# Patient Record
Sex: MALE | Race: WHITE | NOT HISPANIC OR LATINO | Employment: FULL TIME | URBAN - METROPOLITAN AREA
[De-identification: names, ages, dates, MRNs, and addresses within clinical notes are randomized per-mention and may not be internally consistent; named-entity substitution may affect disease eponyms.]

---

## 2017-01-23 ENCOUNTER — TELEPHONE (OUTPATIENT)
Dept: NEUROLOGY | Facility: CLINIC | Age: 47
End: 2017-01-23

## 2017-01-23 NOTE — TELEPHONE ENCOUNTER
Call placed to pt re: referral message received that MRI brain was done outside of [facility] on 12/21/16. Pt admits this and says that was done at [facility]. He will have report faxed to this office (number provided) and will bring disc to his f/u appt on 2/1/17. Unsure if comparison was done. [facility] MRI appt canceled.

## 2017-01-24 ENCOUNTER — DOCUMENTATION ONLY (OUTPATIENT)
Dept: NEUROLOGY | Facility: CLINIC | Age: 47
End: 2017-01-24

## 2017-01-24 NOTE — PROGRESS NOTES
Fax received from [facility] with MRI brain results (done 12/21/16). Placed in Carmelina's folder for review.

## 2017-01-25 NOTE — PROGRESS NOTES
Report received/reviewed from [facility] regarding MRI Brain performed on 12/21/2016.  Stable MRI as compared to previous done 1/2016 with no new or enhancing lesions noted.   Will upload report into [facility]  No Disc for review

## 2017-07-13 ENCOUNTER — LAB VISIT (OUTPATIENT)
Dept: LAB | Facility: HOSPITAL | Age: 47
End: 2017-07-13
Attending: PSYCHIATRY & NEUROLOGY
Payer: COMMERCIAL

## 2017-07-13 DIAGNOSIS — G35 MULTIPLE SCLEROSIS: Primary | ICD-10-CM

## 2017-07-13 PROCEDURE — 88108 CYTOPATH CONCENTRATE TECH: CPT | Performed by: PATHOLOGY

## 2019-04-23 ENCOUNTER — TELEPHONE (OUTPATIENT)
Dept: ORTHOPEDICS | Facility: CLINIC | Age: 49
End: 2019-04-23

## 2019-04-23 NOTE — TELEPHONE ENCOUNTER
----- Message from Laure Ruano MA sent at 4/23/2019 12:31 PM CDT -----  Contact: kristen   Wants to be seen 04/29/2019, surgery follow up   Call back

## 2019-04-26 ENCOUNTER — TELEPHONE (OUTPATIENT)
Dept: ORTHOPEDICS | Facility: CLINIC | Age: 49
End: 2019-04-26

## 2019-04-26 NOTE — TELEPHONE ENCOUNTER
Spoke to patient. Advised to contact [facility] to have pain medication refilled. Patient stated understanding.

## 2019-04-26 NOTE — TELEPHONE ENCOUNTER
----- Message from Laure Ruano MA sent at 4/26/2019  1:51 PM CDT -----  Contact: kristen Payton taken out today,, finger is throbbing  Pharmacy [facility]    Call back

## 2019-04-29 ENCOUNTER — OFFICE VISIT (OUTPATIENT)
Dept: ORTHOPEDICS | Facility: CLINIC | Age: 49
End: 2019-04-29
Payer: COMMERCIAL

## 2019-04-29 VITALS
TEMPERATURE: 98 F | SYSTOLIC BLOOD PRESSURE: 131 MMHG | HEIGHT: 71 IN | RESPIRATION RATE: 20 BRPM | WEIGHT: 210.13 LBS | DIASTOLIC BLOOD PRESSURE: 90 MMHG | HEART RATE: 101 BPM | BODY MASS INDEX: 29.42 KG/M2

## 2019-04-29 DIAGNOSIS — M24.541 CONTRACTURE OF FINGER JOINT, RIGHT: Primary | ICD-10-CM

## 2019-04-29 PROCEDURE — 99999 PR PBB SHADOW E&M-EST. PATIENT-LVL IV: CPT | Mod: PBBFAC,,, | Performed by: ORTHOPAEDIC SURGERY

## 2019-04-29 PROCEDURE — 99999 PR PBB SHADOW E&M-EST. PATIENT-LVL IV: ICD-10-PCS | Mod: PBBFAC,,, | Performed by: ORTHOPAEDIC SURGERY

## 2019-04-29 PROCEDURE — 99024 POSTOP FOLLOW-UP VISIT: CPT | Mod: S$GLB,,, | Performed by: ORTHOPAEDIC SURGERY

## 2019-04-29 PROCEDURE — 99024 PR POST-OP FOLLOW-UP VISIT: ICD-10-PCS | Mod: S$GLB,,, | Performed by: ORTHOPAEDIC SURGERY

## 2019-04-29 NOTE — PROGRESS NOTES
Mr Galeano returns to clinic today. He is approximately 6 weeks status post right small finger PIP pinning with contracture release.  He is overall doing well.  His K-wires were removed several days ago.  He has no other complaints.    Physical exam:  Examination of the right small finger reveals that all wounds are well healed.  There is no edema.  There is no drainage.  Palpation produces no significant tenderness.  Is grossly neurovascularly intact.    Assessment:  Status post right small finger PIP contracture release    Plan:    1.  I will set him up with occupational therapy to begin range of motion    2.  He can begin washing his small finger and doing light daily dressing changes    3.  Follow-up with me in 4 weeks for repeat evaluation.

## 2019-04-30 ENCOUNTER — TELEPHONE (OUTPATIENT)
Dept: ORTHOPEDICS | Facility: CLINIC | Age: 49
End: 2019-04-30

## 2019-04-30 NOTE — TELEPHONE ENCOUNTER
----- Message from Fazal Morocho sent at 4/30/2019  9:47 AM CDT -----  Contact: patient  Type: Needs Medical Advice    Who Called:  patient  Symptoms (please be specific):    How long has patient had these symptoms:    Pharmacy name and phone #:    Best Call Back Number: [phone]  Additional Information: requesting a call back regarding physical therapy,stated order should have been sent to [place] [facility]#[phone]

## 2019-04-30 NOTE — TELEPHONE ENCOUNTER
I informed patient that I am faxing order over immediately to [facility] in [place] at [phone].  Pt verbalized understanding.  I advised pt to call clinic for any other concerns.

## 2019-05-29 ENCOUNTER — OFFICE VISIT (OUTPATIENT)
Dept: ORTHOPEDICS | Facility: CLINIC | Age: 49
End: 2019-05-29
Payer: COMMERCIAL

## 2019-05-29 VITALS
HEART RATE: 92 BPM | WEIGHT: 228 LBS | DIASTOLIC BLOOD PRESSURE: 85 MMHG | BODY MASS INDEX: 31.92 KG/M2 | HEIGHT: 71 IN | SYSTOLIC BLOOD PRESSURE: 136 MMHG

## 2019-05-29 DIAGNOSIS — M24.541 CONTRACTURE OF FINGER JOINT, RIGHT: Primary | ICD-10-CM

## 2019-05-29 PROCEDURE — 99024 POSTOP FOLLOW-UP VISIT: CPT | Mod: S$GLB,,, | Performed by: ORTHOPAEDIC SURGERY

## 2019-05-29 PROCEDURE — 99999 PR PBB SHADOW E&M-EST. PATIENT-LVL III: CPT | Mod: PBBFAC,,, | Performed by: ORTHOPAEDIC SURGERY

## 2019-05-29 PROCEDURE — 99024 PR POST-OP FOLLOW-UP VISIT: ICD-10-PCS | Mod: S$GLB,,, | Performed by: ORTHOPAEDIC SURGERY

## 2019-05-29 PROCEDURE — 99999 PR PBB SHADOW E&M-EST. PATIENT-LVL III: ICD-10-PCS | Mod: PBBFAC,,, | Performed by: ORTHOPAEDIC SURGERY

## 2019-05-29 RX ORDER — MIRTAZAPINE 30 MG/1
30 TABLET, FILM COATED ORAL NIGHTLY
COMMUNITY

## 2019-05-29 NOTE — PROGRESS NOTES
Mr Galeano returns to clinic today.  Has a history of right small finger contracture release.  He continues to work with therapy but has some stiffness in the finger.    Physical exam:  Examination the right small finger reveals that all wounds are well healed.  There is no edema or erythema.  Range of motion is 30 to 50° at the PIP joint.  MCP and DIP motion are full.  He does report intact sensation with capillary refill less than 2 sec    Assessment:  Status post right small finger contracture release    Plan:    1.  He will continue work with therapy and I will increase the frequency to 3 times per week    2.  He is allowed to increase activity as tolerated    3.  Will follow up with me in 6 weeks for repeat evaluation

## 2020-11-20 PROBLEM — N20.0 KIDNEY STONE: Status: ACTIVE | Noted: 2020-11-20

## 2021-12-21 ENCOUNTER — PATIENT MESSAGE (OUTPATIENT)
Dept: NEUROLOGY | Facility: CLINIC | Age: 51
End: 2021-12-21
Payer: COMMERCIAL